# Patient Record
Sex: MALE | URBAN - METROPOLITAN AREA
[De-identification: names, ages, dates, MRNs, and addresses within clinical notes are randomized per-mention and may not be internally consistent; named-entity substitution may affect disease eponyms.]

---

## 2020-09-24 ENCOUNTER — EMERGENCY (EMERGENCY)
Facility: HOSPITAL | Age: 30
LOS: 1 days | Discharge: HOME | End: 2020-09-24
Attending: EMERGENCY MEDICINE | Admitting: EMERGENCY MEDICINE
Payer: COMMERCIAL

## 2020-09-24 VITALS
WEIGHT: 315 LBS | HEIGHT: 72 IN | TEMPERATURE: 99 F | OXYGEN SATURATION: 99 % | RESPIRATION RATE: 18 BRPM | HEART RATE: 90 BPM | DIASTOLIC BLOOD PRESSURE: 100 MMHG | SYSTOLIC BLOOD PRESSURE: 156 MMHG

## 2020-09-24 VITALS
RESPIRATION RATE: 20 BRPM | HEART RATE: 90 BPM | DIASTOLIC BLOOD PRESSURE: 93 MMHG | OXYGEN SATURATION: 97 % | SYSTOLIC BLOOD PRESSURE: 157 MMHG

## 2020-09-24 LAB
ALBUMIN SERPL ELPH-MCNC: 4.4 G/DL — SIGNIFICANT CHANGE UP (ref 3.3–5)
ALP SERPL-CCNC: 70 U/L — SIGNIFICANT CHANGE UP (ref 40–120)
ALT FLD-CCNC: 36 U/L — SIGNIFICANT CHANGE UP (ref 10–45)
ANION GAP SERPL CALC-SCNC: 11 MMOL/L — SIGNIFICANT CHANGE UP (ref 5–17)
ANISOCYTOSIS BLD QL: SLIGHT — SIGNIFICANT CHANGE UP
APTT BLD: 34.8 SEC — SIGNIFICANT CHANGE UP (ref 27.5–35.5)
AST SERPL-CCNC: 30 U/L — SIGNIFICANT CHANGE UP (ref 10–40)
BASOPHILS # BLD AUTO: 0.07 K/UL — SIGNIFICANT CHANGE UP (ref 0–0.2)
BASOPHILS NFR BLD AUTO: 0.9 % — SIGNIFICANT CHANGE UP (ref 0–2)
BILIRUB SERPL-MCNC: 0.2 MG/DL — SIGNIFICANT CHANGE UP (ref 0.2–1.2)
BUN SERPL-MCNC: 16 MG/DL — SIGNIFICANT CHANGE UP (ref 7–23)
CALCIUM SERPL-MCNC: 9.2 MG/DL — SIGNIFICANT CHANGE UP (ref 8.4–10.5)
CHLORIDE SERPL-SCNC: 99 MMOL/L — SIGNIFICANT CHANGE UP (ref 96–108)
CO2 SERPL-SCNC: 26 MMOL/L — SIGNIFICANT CHANGE UP (ref 22–31)
CREAT SERPL-MCNC: 0.98 MG/DL — SIGNIFICANT CHANGE UP (ref 0.5–1.3)
DACRYOCYTES BLD QL SMEAR: SLIGHT — SIGNIFICANT CHANGE UP
EOSINOPHIL # BLD AUTO: 0.26 K/UL — SIGNIFICANT CHANGE UP (ref 0–0.5)
EOSINOPHIL NFR BLD AUTO: 3.5 % — SIGNIFICANT CHANGE UP (ref 0–6)
GIANT PLATELETS BLD QL SMEAR: PRESENT — SIGNIFICANT CHANGE UP
GLUCOSE SERPL-MCNC: 240 MG/DL — HIGH (ref 70–99)
HCT VFR BLD CALC: 42.2 % — SIGNIFICANT CHANGE UP (ref 39–50)
HGB BLD-MCNC: 13.1 G/DL — SIGNIFICANT CHANGE UP (ref 13–17)
INR BLD: 1.08 — SIGNIFICANT CHANGE UP (ref 0.88–1.16)
LYMPHOCYTES # BLD AUTO: 2.95 K/UL — SIGNIFICANT CHANGE UP (ref 1–3.3)
LYMPHOCYTES # BLD AUTO: 40.4 % — SIGNIFICANT CHANGE UP (ref 13–44)
MANUAL SMEAR VERIFICATION: SIGNIFICANT CHANGE UP
MCHC RBC-ENTMCNC: 22.5 PG — LOW (ref 27–34)
MCHC RBC-ENTMCNC: 31 GM/DL — LOW (ref 32–36)
MCV RBC AUTO: 72.4 FL — LOW (ref 80–100)
MICROCYTES BLD QL: SLIGHT — SIGNIFICANT CHANGE UP
MONOCYTES # BLD AUTO: 0.45 K/UL — SIGNIFICANT CHANGE UP (ref 0–0.9)
MONOCYTES NFR BLD AUTO: 6.1 % — SIGNIFICANT CHANGE UP (ref 2–14)
NEUTROPHILS # BLD AUTO: 3.58 K/UL — SIGNIFICANT CHANGE UP (ref 1.8–7.4)
NEUTROPHILS NFR BLD AUTO: 49.1 % — SIGNIFICANT CHANGE UP (ref 43–77)
NT-PROBNP SERPL-SCNC: <5 PG/ML — SIGNIFICANT CHANGE UP (ref 0–300)
OVALOCYTES BLD QL SMEAR: SLIGHT — SIGNIFICANT CHANGE UP
PLAT MORPH BLD: ABNORMAL
PLATELET # BLD AUTO: 229 K/UL — SIGNIFICANT CHANGE UP (ref 150–400)
POIKILOCYTOSIS BLD QL AUTO: SLIGHT — SIGNIFICANT CHANGE UP
POLYCHROMASIA BLD QL SMEAR: SLIGHT — SIGNIFICANT CHANGE UP
POTASSIUM SERPL-MCNC: 4.7 MMOL/L — SIGNIFICANT CHANGE UP (ref 3.5–5.3)
POTASSIUM SERPL-SCNC: 4.7 MMOL/L — SIGNIFICANT CHANGE UP (ref 3.5–5.3)
PROT SERPL-MCNC: 8.3 G/DL — SIGNIFICANT CHANGE UP (ref 6–8.3)
PROTHROM AB SERPL-ACNC: 12.9 SEC — SIGNIFICANT CHANGE UP (ref 10.6–13.6)
RBC # BLD: 5.83 M/UL — HIGH (ref 4.2–5.8)
RBC # FLD: 16 % — HIGH (ref 10.3–14.5)
RBC BLD AUTO: ABNORMAL
SARS-COV-2 RNA SPEC QL NAA+PROBE: SIGNIFICANT CHANGE UP
SMUDGE CELLS # BLD: PRESENT — SIGNIFICANT CHANGE UP
SODIUM SERPL-SCNC: 136 MMOL/L — SIGNIFICANT CHANGE UP (ref 135–145)
SPHEROCYTES BLD QL SMEAR: SLIGHT — SIGNIFICANT CHANGE UP
TROPONIN T SERPL-MCNC: <0.01 NG/ML — SIGNIFICANT CHANGE UP (ref 0–0.01)
WBC # BLD: 7.3 K/UL — SIGNIFICANT CHANGE UP (ref 3.8–10.5)
WBC # FLD AUTO: 7.3 K/UL — SIGNIFICANT CHANGE UP (ref 3.8–10.5)

## 2020-09-24 PROCEDURE — 80053 COMPREHEN METABOLIC PANEL: CPT

## 2020-09-24 PROCEDURE — 36415 COLL VENOUS BLD VENIPUNCTURE: CPT

## 2020-09-24 PROCEDURE — 99285 EMERGENCY DEPT VISIT HI MDM: CPT

## 2020-09-24 PROCEDURE — 82962 GLUCOSE BLOOD TEST: CPT

## 2020-09-24 PROCEDURE — U0003: CPT

## 2020-09-24 PROCEDURE — 84484 ASSAY OF TROPONIN QUANT: CPT

## 2020-09-24 PROCEDURE — 83880 ASSAY OF NATRIURETIC PEPTIDE: CPT

## 2020-09-24 PROCEDURE — 93010 ELECTROCARDIOGRAM REPORT: CPT

## 2020-09-24 PROCEDURE — 85730 THROMBOPLASTIN TIME PARTIAL: CPT

## 2020-09-24 PROCEDURE — 71046 X-RAY EXAM CHEST 2 VIEWS: CPT | Mod: 26

## 2020-09-24 PROCEDURE — 71275 CT ANGIOGRAPHY CHEST: CPT | Mod: 26

## 2020-09-24 PROCEDURE — 99284 EMERGENCY DEPT VISIT MOD MDM: CPT | Mod: 25

## 2020-09-24 PROCEDURE — 85025 COMPLETE CBC W/AUTO DIFF WBC: CPT

## 2020-09-24 PROCEDURE — 71046 X-RAY EXAM CHEST 2 VIEWS: CPT

## 2020-09-24 PROCEDURE — 71275 CT ANGIOGRAPHY CHEST: CPT

## 2020-09-24 PROCEDURE — 93005 ELECTROCARDIOGRAM TRACING: CPT

## 2020-09-24 PROCEDURE — 85610 PROTHROMBIN TIME: CPT

## 2020-09-24 PROCEDURE — 96360 HYDRATION IV INFUSION INIT: CPT | Mod: XU

## 2020-09-24 RX ORDER — SODIUM CHLORIDE 9 MG/ML
1000 INJECTION INTRAMUSCULAR; INTRAVENOUS; SUBCUTANEOUS ONCE
Refills: 0 | Status: COMPLETED | OUTPATIENT
Start: 2020-09-24 | End: 2020-09-24

## 2020-09-24 RX ADMIN — SODIUM CHLORIDE 1000 MILLILITER(S): 9 INJECTION INTRAMUSCULAR; INTRAVENOUS; SUBCUTANEOUS at 13:10

## 2020-09-24 RX ADMIN — SODIUM CHLORIDE 1000 MILLILITER(S): 9 INJECTION INTRAMUSCULAR; INTRAVENOUS; SUBCUTANEOUS at 15:07

## 2020-09-24 NOTE — ED PROVIDER NOTE - PROVIDER TOKENS
PROVIDER:[TOKEN:[[id number]],FOLLOWUP:[4-6 Days]],PROVIDER:[TOKEN:[[id number]],FOLLOWUP:[4-6 Days]],PROVIDER:[TOKEN:[[id number]],FOLLOWUP:[4-6 Days]]

## 2020-09-24 NOTE — ED PROVIDER NOTE - CARE PROVIDERS DIRECT ADDRESSES
[email or web address]

## 2020-09-24 NOTE — ED PROVIDER NOTE - CARE PROVIDER_API CALL
Filiberto Daily I  MEDICINE  [address] [place] [address]  Phone: [phone]  Fax: [phone]  Follow Up Time: 4-6 Days    Maco Barros)  Cardiovascular Disease; Internal Medicine  Cardiology [facility], [address] [place] [address]  Phone: [phone]  Fax: [phone]  Follow Up Time: 4-6 Days    Alex Dahl  Endocrinology, Diabetes and Metabolism  [address], [address] [place] [address]  Phone: [phone]  Fax: [phone]  Follow Up Time: 4-6 Days

## 2020-09-24 NOTE — ED PROVIDER NOTE - ATTENDING CONTRIBUTION TO CARE
30 preDM, obese, p/w 2 weeks HURD, 1 block.  HTN on exam.  No JVD, obese, no edema.  Clear lungs, spo2 99, nl heart no murmur.  Plan labs, chest, ekg, bnp and reassess.

## 2020-09-24 NOTE — ED ADULT TRIAGE NOTE - WEIGHT METHOD
Consent (Nose)/Introductory Paragraph: The rationale for Mohs was explained to the patient and consent was obtained. The risks, benefits and alternatives to therapy were discussed in detail. Specifically, the risks of nasal deformity, changes in the flow of air through the nose, infection, scarring, bleeding, prolonged wound healing, incomplete removal, allergy to anesthesia, nerve injury and recurrence were addressed. Prior to the procedure, the treatment site was clearly identified and confirmed by the patient. All components of Universal Protocol/PAUSE Rule completed. stated

## 2020-09-24 NOTE — ED PROVIDER NOTE - CLINICAL SUMMARY MEDICAL DECISION MAKING FREE TEXT BOX
SOB ? etiology. no PE risk factors. pt well appearing. vss. lungs clear on exam. labs noted. troponin negative. ecg  t inversions avl avf. cxr no acute pathology. cta done and no pe. d/w pt pulomanry htn and elevated glucose. pt reports pmd sent rx for DM meds to his pharmacy he just has to pick them up. will d/c home to f/u with cardiology , endocrinology and pmd.

## 2020-09-24 NOTE — ED PROVIDER NOTE - PATIENT PORTAL LINK FT
You can access the FollowMyHealth Patient Portal offered by [facility] by registering at the following website: [email or web address]. By joining [facility]’s FollowMyHealth portal, you will also be able to view your health information using other applications (apps) compatible with our system.

## 2020-09-24 NOTE — ED ADULT TRIAGE NOTE - OTHER COMPLAINTS
patient ambulatory into ED c/o worsening SOB and HURD with palpitations x 2 weeks, denies medical hx/fevers/chills/CP, speaking in full complete sentences

## 2020-09-24 NOTE — ED PROVIDER NOTE - OBJECTIVE STATEMENT
29 y/o M PMHx of prediabetic, presents to the ED complaining of SOB x 2 weeks. Pt was active over the summer and states he did not experience SOB at that time. However, for the past 2 weeks, when pt walks up a block, he would be out of breath. Has a minor cough with deep breath. No cough, wheezing, leg pain, leg swelling, abdominal pain, back pain, and no cardiac history.

## 2020-09-24 NOTE — ED ADULT NURSE NOTE - OBJECTIVE STATEMENT
Received pt awake alert and oriented x 3. Pt states a few weeks ago he started having SOB, HURD and went to his doctor and found that his COVID antibody was positive. Pt mentioned he needs a second opinion of what's going on. Denies chest pain at this time. Received pt awake alert and oriented x 3. Pt states a few weeks ago he started having SOB, HURD and went to his doctor and found that his COVID antibody was positive. Pt mentioned he needs a second opinion of what's going on. Denies chest pain at this time. B/p elevated on arrival. Afebrile. will continue to monitor.

## 2020-09-24 NOTE — ED PROVIDER NOTE - NSFOLLOWUPINSTRUCTIONS_ED_ALL_ED_FT
Follow up with your primary care physician, cardiology and endocrinology this week. Bring a copy of the cat scan results to your doctor to discuss the hepatic node.           Dyspnea    WHAT YOU NEED TO KNOW:    Dyspnea is breathing difficulty or discomfort. You may have labored, painful, or shallow breathing. You may feel breathless or short of breath. Dyspnea can occur during rest or with activity. You may have dyspnea for a short time, or it might become chronic. Dyspnea is often a symptom of a disease or condition.    DISCHARGE INSTRUCTIONS:    Return to the emergency department if:   •Your signs and symptoms are the same or worse within 24 hours of treatment.       •You have shaking chills or a fever over 102°F.       •You have new pain, pressure, or tightness in your chest.       •You have a new or worse cough or wheezing, or you cough up blood.      •You feel like you cannot get enough air.      •The skin over your ribs or on your neck sinks in when you breathe.       •You have a severe headache with vomiting and abdominal pain.       •You feel confused or dizzy.      Contact your healthcare provider or specialist if:   •You have questions or concerns about your condition or care.          Medicines:    •Medicines may be used to treat the cause of your dyspnea. Medicines may reduce swelling in your airway or decrease extra fluid from around your heart or lungs. Other medicines may be used to decrease anxiety and help you feel calm and relaxed.      •Take your medicine as directed. Contact your healthcare provider if you think your medicine is not helping or if you have side effects. Tell him or her if you are allergic to any medicine. Keep a list of the medicines, vitamins, and herbs you take. Include the amounts, and when and why you take them. Bring the list or the pill bottles to follow-up visits. Carry your medicine list with you in case of an emergency.      Manage long-term dyspnea:   •Create an action plan. You and your healthcare provider can work together to create a plan for how to handle episodes of dyspnea. The plan can include daily activities, treatment changes, and what to do if you have severe breathing problems.      •Lean forward on your elbows when you sit. This helps your lungs expand and may make it easier to breathe.      •Use pursed-lip breathing any time you feel short of breath. Breathe in through your nose and then slowly breathe out through your mouth with your lips slightly puckered. It should take you twice as long to breathe out as it did to breathe in.  Breathe in Breathe out           •Do not smoke. Nicotine and other chemicals in cigarettes and cigars can cause lung damage and make it harder to breathe. Ask your healthcare provider for information if you currently smoke and need help to quit. E-cigarettes or smokeless tobacco still contain nicotine. Talk to your healthcare provider before you use these products.       •Reach or maintain a healthy weight. Your healthcare provider can help you create a safe weight loss plan if you are overweight.      •Exercise as directed. Exercise can help your lungs work more easily. Exercise can also help you lose weight if needed. Try to get at least 30 minutes of exercise most days of the week. Your healthcare provider can help you create an exercise plan that is safe for you.      Follow up with your healthcare provider or specialist as directed: Write down your questions so you remember to ask them during your visits.       © Copyright [employer] 2020           back to top                          © Copyright [employer] 2020

## 2020-09-27 DIAGNOSIS — R06.02 SHORTNESS OF BREATH: ICD-10-CM

## 2020-09-27 DIAGNOSIS — Z20.828 CONTACT WITH AND (SUSPECTED) EXPOSURE TO OTHER VIRAL COMMUNICABLE DISEASES: ICD-10-CM

## 2020-09-27 DIAGNOSIS — R05 COUGH: ICD-10-CM

## 2020-09-29 PROBLEM — Z00.00 ENCOUNTER FOR PREVENTIVE HEALTH EXAMINATION: Noted: 2020-09-29

## 2020-09-30 ENCOUNTER — APPOINTMENT (OUTPATIENT)
Dept: ENDOCRINOLOGY | Facility: CLINIC | Age: 30
End: 2020-09-30
Payer: COMMERCIAL

## 2020-09-30 VITALS
HEIGHT: 72 IN | WEIGHT: 315 LBS | DIASTOLIC BLOOD PRESSURE: 98 MMHG | SYSTOLIC BLOOD PRESSURE: 138 MMHG | BODY MASS INDEX: 42.66 KG/M2

## 2020-09-30 DIAGNOSIS — Z78.9 OTHER SPECIFIED HEALTH STATUS: ICD-10-CM

## 2020-09-30 DIAGNOSIS — E11.59 TYPE 2 DIABETES MELLITUS WITH OTHER CIRCULATORY COMPLICATIONS: ICD-10-CM

## 2020-09-30 LAB
GLUCOSE BLDC GLUCOMTR-MCNC: 123
HBA1C MFR BLD HPLC: 9.4

## 2020-09-30 PROCEDURE — 99204 OFFICE O/P NEW MOD 45 MIN: CPT | Mod: 25

## 2020-09-30 PROCEDURE — 82962 GLUCOSE BLOOD TEST: CPT

## 2020-09-30 PROCEDURE — 83036 HEMOGLOBIN GLYCOSYLATED A1C: CPT | Mod: QW

## 2020-09-30 RX ORDER — ORAL SEMAGLUTIDE 3 MG/1
3 TABLET ORAL
Refills: 0 | Status: ACTIVE | COMMUNITY
Start: 2020-09-30

## 2020-09-30 RX ORDER — FLASH GLUCOSE SENSOR
KIT MISCELLANEOUS
Qty: 6 | Refills: 3 | Status: ACTIVE | COMMUNITY
Start: 2020-09-30 | End: 1900-01-01

## 2020-09-30 RX ORDER — BLOOD SUGAR DIAGNOSTIC
STRIP MISCELLANEOUS DAILY
Qty: 100 | Refills: 3 | Status: ACTIVE | COMMUNITY
Start: 2020-09-30 | End: 1900-01-01

## 2020-09-30 RX ORDER — LANCETS
EACH MISCELLANEOUS
Qty: 100 | Refills: 3 | Status: ACTIVE | COMMUNITY
Start: 2020-09-30 | End: 1900-01-01

## 2020-09-30 NOTE — REASON FOR VISIT
[Initial Evaluation] : an initial evaluation [DM Type 2] : DM Type 2 [Weight Management/Obesity] : weight management/obesity

## 2020-10-09 ENCOUNTER — APPOINTMENT (OUTPATIENT)
Dept: ENDOCRINOLOGY | Facility: CLINIC | Age: 30
End: 2020-10-09

## 2020-10-13 LAB
ALBUMIN SERPL ELPH-MCNC: 4.5 G/DL
ALP BLD-CCNC: 62 U/L
ALT SERPL-CCNC: 39 U/L
ANION GAP SERPL CALC-SCNC: 15 MMOL/L
AST SERPL-CCNC: 44 U/L
BASOPHILS # BLD AUTO: 0.05 K/UL
BASOPHILS NFR BLD AUTO: 0.7 %
BILIRUB SERPL-MCNC: 0.2 MG/DL
BUN SERPL-MCNC: 16 MG/DL
CALCIUM SERPL-MCNC: 9.8 MG/DL
CHLORIDE SERPL-SCNC: 104 MMOL/L
CHOLEST SERPL-MCNC: 190 MG/DL
CHOLEST/HDLC SERPL: 4.4 RATIO
CO2 SERPL-SCNC: 23 MMOL/L
CREAT SERPL-MCNC: 1.2 MG/DL
CREAT SPEC-SCNC: 92 MG/DL
EOSINOPHIL # BLD AUTO: 0.1 K/UL
EOSINOPHIL NFR BLD AUTO: 1.4 %
ESTIMATED AVERAGE GLUCOSE: 232 MG/DL
GLUCOSE SERPL-MCNC: 115 MG/DL
HBA1C MFR BLD HPLC: 9.7 %
HCT VFR BLD CALC: 43.6 %
HDLC SERPL-MCNC: 43 MG/DL
HGB BLD-MCNC: 13.3 G/DL
IMM GRANULOCYTES NFR BLD AUTO: 0.1 %
LDLC SERPL CALC-MCNC: 112 MG/DL
LYMPHOCYTES # BLD AUTO: 2.85 K/UL
LYMPHOCYTES NFR BLD AUTO: 40 %
MAN DIFF?: NORMAL
MCHC RBC-ENTMCNC: 22.4 PG
MCHC RBC-ENTMCNC: 30.5 GM/DL
MCV RBC AUTO: 73.4 FL
MICROALBUMIN 24H UR DL<=1MG/L-MCNC: <1.2 MG/DL
MICROALBUMIN/CREAT 24H UR-RTO: NORMAL MG/G
MONOCYTES # BLD AUTO: 0.58 K/UL
MONOCYTES NFR BLD AUTO: 8.1 %
NEUTROPHILS # BLD AUTO: 3.53 K/UL
NEUTROPHILS NFR BLD AUTO: 49.7 %
PLATELET # BLD AUTO: 272 K/UL
POTASSIUM SERPL-SCNC: 3.9 MMOL/L
PROT SERPL-MCNC: 7.5 G/DL
RBC # BLD: 5.94 M/UL
RBC # FLD: 18.2 %
SODIUM SERPL-SCNC: 141 MMOL/L
T3 SERPL-MCNC: 98 NG/DL
T4 FREE SERPL-MCNC: 1.2 NG/DL
TRIGL SERPL-MCNC: 175 MG/DL
TSH SERPL-ACNC: 1.84 UIU/ML
WBC # FLD AUTO: 7.12 K/UL

## 2020-10-13 NOTE — ADDENDUM
[FreeTextEntry1] : 10/13/2020 MK\par Called pt to discuss lab results.\suri SIMMS, asking to call me back.

## 2020-10-13 NOTE — HISTORY OF PRESENT ILLNESS
[FreeTextEntry1] : 30 yr old  who was diagnosed with DM about 1 week ago when he went to the ER because of palpitations and SOB. His glucose during that visit was 240 mg/dl.\par He was started on metformin and Jardiance.\par The patient has a h/o prediabetes for about 2 yrs. He has gained about 100 lb over the last year.\par He is scheduled for an ophthalm. exam next week.\par HbA1C today is 9.4%, glucose - 123 mg/dl.

## 2020-10-13 NOTE — ASSESSMENT
[FreeTextEntry1] : DM, type 2, obesity.\par Lab. tests today.\par To begin HBGMng - consider JOSE LUIS CGM.\par Nutrition consultation.\par Exercise physiologist consultation.\par Exercise classes at [facility].\par Information re Rybelsus provided.\par Bariatric surgery referral discussed - declined at this time.\par F/u - 3 months.\par

## 2020-10-23 ENCOUNTER — TRANSCRIPTION ENCOUNTER (OUTPATIENT)
Age: 30
End: 2020-10-23

## 2020-10-28 ENCOUNTER — APPOINTMENT (OUTPATIENT)
Dept: ENDOCRINOLOGY | Facility: CLINIC | Age: 30
End: 2020-10-28
Payer: COMMERCIAL

## 2020-10-28 PROCEDURE — 97802 MEDICAL NUTRITION INDIV IN: CPT | Mod: 95

## 2021-04-07 ENCOUNTER — APPOINTMENT (OUTPATIENT)
Dept: ENDOCRINOLOGY | Facility: CLINIC | Age: 31
End: 2021-04-07
Payer: COMMERCIAL

## 2021-04-07 VITALS
HEART RATE: 65 BPM | HEIGHT: 72 IN | WEIGHT: 315 LBS | BODY MASS INDEX: 42.66 KG/M2 | SYSTOLIC BLOOD PRESSURE: 148 MMHG | DIASTOLIC BLOOD PRESSURE: 74 MMHG

## 2021-04-07 LAB — GLUCOSE BLDC GLUCOMTR-MCNC: 87

## 2021-04-07 PROCEDURE — 99214 OFFICE O/P EST MOD 30 MIN: CPT | Mod: 25

## 2021-04-07 PROCEDURE — 82962 GLUCOSE BLOOD TEST: CPT

## 2021-04-07 PROCEDURE — 99072 ADDL SUPL MATRL&STAF TM PHE: CPT

## 2021-04-07 PROCEDURE — 83036 HEMOGLOBIN GLYCOSYLATED A1C: CPT | Mod: QW

## 2021-04-07 NOTE — ASSESSMENT
[FreeTextEntry1] : DM, type 2, adequate glycemic control - would continue current tx.\par Obesity.\par Medications refilled.\par F/u - 3 months.

## 2021-04-09 RX ORDER — EMPAGLIFLOZIN AND METFORMIN HYDROCHLORIDE 12.5; 5 MG/1; MG/1
12.5-5 TABLET ORAL
Qty: 180 | Refills: 3 | Status: DISCONTINUED | COMMUNITY
Start: 2021-04-07 | End: 2021-04-09

## 2021-04-13 ENCOUNTER — NON-APPOINTMENT (OUTPATIENT)
Age: 31
End: 2021-04-13

## 2021-04-19 ENCOUNTER — TRANSCRIPTION ENCOUNTER (OUTPATIENT)
Age: 31
End: 2021-04-19

## 2021-05-13 ENCOUNTER — TRANSCRIPTION ENCOUNTER (OUTPATIENT)
Age: 31
End: 2021-05-13

## 2021-06-23 NOTE — ED ADULT NURSE NOTE - CADM POA URETHRAL CATHETER
Mild thickening of the heart muscle rest okay.  Should keep the blood pressure under tight control No

## 2021-07-07 ENCOUNTER — TRANSCRIPTION ENCOUNTER (OUTPATIENT)
Age: 31
End: 2021-07-07

## 2021-07-14 ENCOUNTER — APPOINTMENT (OUTPATIENT)
Dept: ENDOCRINOLOGY | Facility: CLINIC | Age: 31
End: 2021-07-14
Payer: COMMERCIAL

## 2021-07-14 VITALS
DIASTOLIC BLOOD PRESSURE: 91 MMHG | HEART RATE: 73 BPM | SYSTOLIC BLOOD PRESSURE: 137 MMHG | BODY MASS INDEX: 42.66 KG/M2 | WEIGHT: 315 LBS | HEIGHT: 72 IN

## 2021-07-14 DIAGNOSIS — Z86.39 PERSONAL HISTORY OF OTHER ENDOCRINE, NUTRITIONAL AND METABOLIC DISEASE: ICD-10-CM

## 2021-07-14 LAB
GLUCOSE BLDC GLUCOMTR-MCNC: 105
HBA1C MFR BLD HPLC: 6.5

## 2021-07-14 PROCEDURE — 82962 GLUCOSE BLOOD TEST: CPT

## 2021-07-14 PROCEDURE — 99072 ADDL SUPL MATRL&STAF TM PHE: CPT

## 2021-07-14 PROCEDURE — 99214 OFFICE O/P EST MOD 30 MIN: CPT | Mod: 25

## 2021-07-14 PROCEDURE — 83036 HEMOGLOBIN GLYCOSYLATED A1C: CPT | Mod: QW

## 2021-07-14 NOTE — ASSESSMENT
[FreeTextEntry1] : DM, type 2, adequate glycemic control.\par Obesity.\par \par Will increase metformin to 1000 mg BID.\par GLP1 agonists discussed -declined.\par Lab. test declined today.\par medications refilled.\par F/U - 3 months.

## 2021-07-14 NOTE — HISTORY OF PRESENT ILLNESS
[FreeTextEntry1] : 30 yr old  who was diagnosed with DM about 1 week ago when he went to the ER because of palpitations and SOB. His glucose during that visit was 240 mg/dl.\par He was started on metformin and Jardiance.\par The patient has a h/o prediabetes for about 2 yrs. He has gained about 100 lb over the last year.\par He is scheduled for an ophthalm. exam next week.\par HbA1C today is 9.4%, glucose - 123 mg/dl.\par 4/7/21. The patient is doing very well.\par He is on tx with metformin and Jardiance.\par He has lost 13 lb.\par His recent eye exam was reportedly without retinopathy.\par Glucose today is 87 mg/dl, HbA1C - 6.7% down from 9.4%).\par He feels well.\par 7/14/21. The patient is doing well. he has lost another 5 lb since the previous visit, 18 lb since the initial visit.\par HbA1C today is 6.5%, glucose - 105 mg/dl.\par

## 2021-11-09 ENCOUNTER — TRANSCRIPTION ENCOUNTER (OUTPATIENT)
Age: 31
End: 2021-11-09

## 2022-07-28 ENCOUNTER — APPOINTMENT (OUTPATIENT)
Dept: ENDOCRINOLOGY | Facility: CLINIC | Age: 32
End: 2022-07-28

## 2022-09-30 ENCOUNTER — RX RENEWAL (OUTPATIENT)
Age: 32
End: 2022-09-30

## 2022-10-18 PROBLEM — Z01.84 ENCOUNTER FOR ANTIBODY RESPONSE EXAMINATION: Chronic | Status: ACTIVE | Noted: 2020-09-24

## 2022-10-21 ENCOUNTER — RX RENEWAL (OUTPATIENT)
Age: 32
End: 2022-10-21

## 2022-11-15 ENCOUNTER — APPOINTMENT (OUTPATIENT)
Dept: ENDOCRINOLOGY | Facility: CLINIC | Age: 32
End: 2022-11-15

## 2022-11-15 VITALS
DIASTOLIC BLOOD PRESSURE: 88 MMHG | WEIGHT: 315 LBS | SYSTOLIC BLOOD PRESSURE: 152 MMHG | BODY MASS INDEX: 49.1 KG/M2 | HEART RATE: 104 BPM

## 2022-11-15 DIAGNOSIS — E11.9 TYPE 2 DIABETES MELLITUS W/OUT COMPLICATIONS: ICD-10-CM

## 2022-11-15 DIAGNOSIS — E66.01 MORBID (SEVERE) OBESITY DUE TO EXCESS CALORIES: ICD-10-CM

## 2022-11-15 DIAGNOSIS — Z13.29 ENCOUNTER FOR SCREENING FOR OTHER SUSPECTED ENDOCRINE DISORDER: ICD-10-CM

## 2022-11-15 LAB
GLUCOSE BLDC GLUCOMTR-MCNC: 131
HBA1C MFR BLD HPLC: 7.5

## 2022-11-15 PROCEDURE — 99215 OFFICE O/P EST HI 40 MIN: CPT | Mod: 25

## 2022-11-15 PROCEDURE — 82962 GLUCOSE BLOOD TEST: CPT

## 2022-11-15 PROCEDURE — 83036 HEMOGLOBIN GLYCOSYLATED A1C: CPT | Mod: QW

## 2022-11-15 NOTE — PHYSICAL EXAM
[Alert] : alert [Obese] : obese [No Acute Distress] : no acute distress [Normal Voice/Communication] : normal voice communication [Normal Sclera/Conjunctiva] : normal sclera/conjunctiva [No Proptosis] : no proptosis [No Lid Lag] : no lid lag [No Respiratory Distress] : no respiratory distress [No Accessory Muscle Use] : no accessory muscle use [Normal Rate and Effort] : normal respiratory rate and effort [Normal Rate] : heart rate was normal [Spine Straight] : spine straight [No Stigmata of Cushings Syndrome] : no stigmata of Cushings Syndrome [Normal Gait] : normal gait [No Clubbing, Cyanosis] : no clubbing  or cyanosis of the fingernails [No Involuntary Movements] : no involuntary movements were seen [No Joint Swelling] : no joint swelling seen [No Rash] : no rash [Foot Ulcers] : no foot ulcers [No Motor Deficits] : the motor exam was normal [No Tremors] : no tremors [Oriented x3] : oriented to person, place, and time

## 2022-11-15 NOTE — ASSESSMENT
[FreeTextEntry1] : Diabetes - control got worse since last visit - due to non-adherence to recommendations for meds and f/u.\par Will Increase metformin to 1000 mg bid;\par Will re-start Jardiance.\par Labs today\par Weight management - denied any injectable meds, as well as Rybelsus.\par Is not interested in bariatric surgery either.\par Referred to RD - stated saw one last year and knows what to do - tries to work on his eating habits.\par F/U with Dr. Dahl in 3 mo or sooner with me PRN.

## 2022-11-15 NOTE — HISTORY OF PRESENT ILLNESS
[FreeTextEntry1] : 33 yo pt of Dr. Dahl came for f/u on DM and weight management after a long absence.\par Feels fine;\par Currently is only on Metformin 500 mg bid. \par POC BG - 131 mg/dl\par POC A1C - 7.5%\par Denies any changes in health since last visit.\par States re-started exercise to manage weight.\par Ophtho - due\par Podiatry - due

## 2022-11-17 LAB
ALBUMIN SERPL ELPH-MCNC: 4.5 G/DL
ALP BLD-CCNC: 57 U/L
ALT SERPL-CCNC: 21 U/L
ANION GAP SERPL CALC-SCNC: 12 MMOL/L
AST SERPL-CCNC: 22 U/L
BASOPHILS # BLD AUTO: 0.06 K/UL
BASOPHILS NFR BLD AUTO: 0.9 %
BILIRUB SERPL-MCNC: 0.2 MG/DL
BUN SERPL-MCNC: 15 MG/DL
CALCIUM SERPL-MCNC: 10 MG/DL
CHLORIDE SERPL-SCNC: 102 MMOL/L
CHOLEST SERPL-MCNC: 210 MG/DL
CO2 SERPL-SCNC: 27 MMOL/L
CREAT SERPL-MCNC: 1.22 MG/DL
CREAT SPEC-SCNC: 327 MG/DL
EGFR: 81 ML/MIN/1.73M2
EOSINOPHIL # BLD AUTO: 0.05 K/UL
EOSINOPHIL NFR BLD AUTO: 0.8 %
ESTIMATED AVERAGE GLUCOSE: 177 MG/DL
GLUCOSE SERPL-MCNC: 147 MG/DL
HBA1C MFR BLD HPLC: 7.8 %
HCT VFR BLD CALC: 45.7 %
HDLC SERPL-MCNC: 49 MG/DL
HGB BLD-MCNC: 13.7 G/DL
IMM GRANULOCYTES NFR BLD AUTO: 0.2 %
LDLC SERPL CALC-MCNC: 118 MG/DL
LYMPHOCYTES # BLD AUTO: 2.35 K/UL
LYMPHOCYTES NFR BLD AUTO: 36.3 %
MAN DIFF?: NORMAL
MCHC RBC-ENTMCNC: 21.8 PG
MCHC RBC-ENTMCNC: 30 GM/DL
MCV RBC AUTO: 72.8 FL
MICROALBUMIN 24H UR DL<=1MG/L-MCNC: 1.4 MG/DL
MICROALBUMIN/CREAT 24H UR-RTO: 4 MG/G
MONOCYTES # BLD AUTO: 0.48 K/UL
MONOCYTES NFR BLD AUTO: 7.4 %
NEUTROPHILS # BLD AUTO: 3.52 K/UL
NEUTROPHILS NFR BLD AUTO: 54.4 %
NONHDLC SERPL-MCNC: 161 MG/DL
PLATELET # BLD AUTO: 304 K/UL
POTASSIUM SERPL-SCNC: 5 MMOL/L
PROT SERPL-MCNC: 7.8 G/DL
RBC # BLD: 6.28 M/UL
RBC # FLD: 18.5 %
SODIUM SERPL-SCNC: 141 MMOL/L
T3 SERPL-MCNC: 123 NG/DL
T4 FREE SERPL-MCNC: 1.1 NG/DL
TRIGL SERPL-MCNC: 218 MG/DL
TSH SERPL-ACNC: 1.61 UIU/ML
WBC # FLD AUTO: 6.47 K/UL

## 2023-02-01 RX ORDER — METFORMIN HYDROCHLORIDE 500 MG/1
500 TABLET, COATED ORAL
Qty: 360 | Refills: 3 | Status: ACTIVE | COMMUNITY
Start: 2022-10-21 | End: 1900-01-01

## 2023-02-01 RX ORDER — EMPAGLIFLOZIN 25 MG/1
25 TABLET, FILM COATED ORAL
Qty: 90 | Refills: 3 | Status: ACTIVE | COMMUNITY
Start: 1900-01-01 | End: 1900-01-01

## 2023-05-08 ENCOUNTER — APPOINTMENT (OUTPATIENT)
Dept: ENDOCRINOLOGY | Facility: CLINIC | Age: 33
End: 2023-05-08